# Patient Record
Sex: MALE | NOT HISPANIC OR LATINO | Employment: UNEMPLOYED | URBAN - METROPOLITAN AREA
[De-identification: names, ages, dates, MRNs, and addresses within clinical notes are randomized per-mention and may not be internally consistent; named-entity substitution may affect disease eponyms.]

---

## 2025-03-03 ENCOUNTER — OFFICE VISIT (OUTPATIENT)
Dept: GASTROENTEROLOGY | Facility: CLINIC | Age: 65
End: 2025-03-03
Payer: MEDICARE

## 2025-03-03 VITALS
TEMPERATURE: 97.3 F | BODY MASS INDEX: 46.65 KG/M2 | HEART RATE: 82 BPM | WEIGHT: 315 LBS | SYSTOLIC BLOOD PRESSURE: 145 MMHG | DIASTOLIC BLOOD PRESSURE: 85 MMHG | HEIGHT: 69 IN

## 2025-03-03 DIAGNOSIS — K31.A0 INTESTINAL METAPLASIA OF STOMACH: ICD-10-CM

## 2025-03-03 DIAGNOSIS — D64.9 ANEMIA, UNSPECIFIED TYPE: ICD-10-CM

## 2025-03-03 DIAGNOSIS — D51.0 PERNICIOUS ANEMIA: ICD-10-CM

## 2025-03-03 DIAGNOSIS — Z86.0100 HISTORY OF COLONIC POLYPS: Primary | ICD-10-CM

## 2025-03-03 PROCEDURE — 3008F BODY MASS INDEX DOCD: CPT | Performed by: STUDENT IN AN ORGANIZED HEALTH CARE EDUCATION/TRAINING PROGRAM

## 2025-03-03 PROCEDURE — 99204 OFFICE O/P NEW MOD 45 MIN: CPT | Performed by: STUDENT IN AN ORGANIZED HEALTH CARE EDUCATION/TRAINING PROGRAM

## 2025-03-03 PROCEDURE — 1159F MED LIST DOCD IN RCRD: CPT | Performed by: STUDENT IN AN ORGANIZED HEALTH CARE EDUCATION/TRAINING PROGRAM

## 2025-03-03 PROCEDURE — 99214 OFFICE O/P EST MOD 30 MIN: CPT | Performed by: STUDENT IN AN ORGANIZED HEALTH CARE EDUCATION/TRAINING PROGRAM

## 2025-03-03 RX ORDER — HYDROCHLOROTHIAZIDE 25 MG/1
25 TABLET ORAL DAILY
COMMUNITY

## 2025-03-03 RX ORDER — CYCLOBENZAPRINE HCL 10 MG
TABLET ORAL
COMMUNITY
Start: 2024-07-02

## 2025-03-03 RX ORDER — ACETAMINOPHEN 500 MG
TABLET ORAL
COMMUNITY
Start: 2022-03-01

## 2025-03-03 RX ORDER — MECLIZINE HYDROCHLORIDE 25 MG/1
25 TABLET ORAL 3 TIMES DAILY PRN
COMMUNITY

## 2025-03-03 RX ORDER — ALBUTEROL SULFATE 90 UG/1
INHALANT RESPIRATORY (INHALATION)
COMMUNITY
Start: 2022-02-10

## 2025-03-03 RX ORDER — LEVOTHYROXINE SODIUM 175 UG/1
175 TABLET ORAL
COMMUNITY

## 2025-03-03 RX ORDER — ACETAMINOPHEN 500 MG
TABLET ORAL
COMMUNITY
Start: 2024-05-23

## 2025-03-03 RX ORDER — POLYETHYLENE GLYCOL 3350, SODIUM SULFATE ANHYDROUS, SODIUM BICARBONATE, SODIUM CHLORIDE, POTASSIUM CHLORIDE 236; 22.74; 6.74; 5.86; 2.97 G/4L; G/4L; G/4L; G/4L; G/4L
4000 POWDER, FOR SOLUTION ORAL ONCE
Qty: 4000 ML | Refills: 0 | Status: SHIPPED | OUTPATIENT
Start: 2025-03-03 | End: 2025-03-03

## 2025-03-03 RX ORDER — ATENOLOL 50 MG/1
1 TABLET ORAL DAILY
COMMUNITY

## 2025-03-03 RX ORDER — ATORVASTATIN CALCIUM 40 MG/1
40 TABLET, FILM COATED ORAL DAILY
COMMUNITY

## 2025-03-03 NOTE — PROGRESS NOTES
"REASON FOR VISIT:  \"check up\", over 20 min late for appt    HPI:  Romario Mckee is a 64 y.o. male with a pmhx of obesity (BMI 52), chronic gastritis, fatty liver, choledocholithiasis s/p ERCP, CCY, HTN, hypothyroidism, HLD, perihepatic abscess 2019, OA who presents for evaluation. The patient is not sure why he is here or referred to GI.      Summary:   Seen by Dr. Harris 2018 for a perihepatic lesion, biopsy consistent with abscess s/p CT guided drainage and abx.     Today, he reports he does not know why he was referred to this appointment. Bms occur every morning, formed, no hematochezia or melena. + flatulence up to 10-15x/day. Ongoing for years. Eats lots of cabbage and beans.  Good appetite. Denies nausea, vomiting, abdominal pain, dysphagia, heartburn, regurgitation, change in bowel habits, early satiety, unintentional weight loss, fatigue, hematochezia, hematemesis, melena or fevers/chills. Not interested in bariatric evaluation.     Labs 1/2025 notable for mild anemia with CBC hgb 12.6, normocytic, AST 11.      No fhx of CRC, esophageal, pancreatic or gastric cancer. No tobacco use, occasional alcohol use.     Prev endoscopic eval:     Endoscopy history:  - Colonoscopy (colon cancer surveillance) 4/17/2017 - Seven diminutive (2 mm) polyps. Resected and retrieved. Path: Tas, inflammatory polyps  Recall 5 years    - EGD 10/6/2016 (Epigastric abdominal pain, Follow-up of acute gastric ulcer)  A few papules (nodules) found in the stomach. Biopsied.  Biopsies were taken with a cold forceps for histology in the gastric body and in the gastric antrum.  No gastric ulcer was seen.   Path: CHRONIC ATROPHIC GASTRITIS WITH INTESTINAL METAPLASIA. -ve HP    - ERCP 6/28/2016 (Stent removal) - Non-bleeding gastric ulcer with a clean ulcer base  (Kaushal Class III). One stent was removed from the biliary tree. The patient has had a cholecystectomy. The biliary tree was swept and sludge was found. Biopsy was performed in " the gastric antrum.  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    - ERCP 6/24/2016 (Suspected ascending cholangitis) Cholangitis. stent was placed into the common bile     - ERCP 6/15/16 (bile duct stone) The major papilla appeared normal.  Choledocholithiasis was found. Complete removal was accomplished by biliary sphincterotomy and basket extraction.     - Colonoscopy 11/22/2011 (screening) Small colonic polyp x 2. Path: TA x 1    REVIEW OF SYSTEMS  CONSTITUTIONAL: Negative for fevers  HEENT: Negative for frequent/significant headaches  RESPIRATORY: Negative for hemoptysis  CARDIOVASCULAR: Negative for chest pain  GI: See HPI  : Negative for hematuria  MUSCULOSKELETAL: Negative for arthralgia or myalgia  INTEGUMENTARY/SKIN: Negative for rash or skin lesion  HEMATOLOGY/LYMPHOLOGY: Negative for prolonged bleeding  ENDOCRINE: Negative for cold/heat intolerance  NEURO: Negative for encephalopathy  PSYCH: Negative for new changes in mood or affect      No Known Allergies    Past Medical History:   Diagnosis Date    Calculus of bile duct without cholangitis or cholecystitis without obstruction 02/03/2017    Choledocholithiasis    Dry eye syndrome of left lacrimal gland 04/30/2015    Dry eye syndrome of left lacrimal gland    Dry eye syndrome of left lacrimal gland 04/30/2015    Dry eye syndrome of left lacrimal gland    Encounter for follow-up examination after completed treatment for conditions other than malignant neoplasm 11/03/2016    Hospital discharge follow-up    Fatty (change of) liver, not elsewhere classified     Fatty liver    Hyperlipidemia, unspecified     Dyslipidemia    Male erectile dysfunction, unspecified     Inability to attain erection    Morbid (severe) obesity due to excess calories (Multi)     Morbid obesity    Other conditions influencing health status     Tubular Adenocarcinoma    Personal history  of colonic polyps 11/18/2016    History of colon polyps    Personal history of colonic polyps 11/18/2016    History of colonic polyps    Personal history of other diseases of the circulatory system     History of hypertension    Personal history of other diseases of the digestive system 02/03/2017    History of chronic gastritis    Personal history of other endocrine, nutritional and metabolic disease     History of hypothyroidism    Personal history of other endocrine, nutritional and metabolic disease     History of hypercholesterolemia    Personal history of other specified conditions     History of syncope    Personal history of other specified conditions     History of nausea and vomiting    Personal history of peptic ulcer disease 10/14/2016    History of gastric ulcer    Rash and other nonspecific skin eruption     Rash    Snoring     Snoring    Unspecified blepharitis right eye, unspecified eyelid 04/30/2015    Blepharitis of right eye    Unspecified blepharitis right eye, unspecified eyelid 04/30/2015    Blepharitis of right eye       Past Surgical History:   Procedure Laterality Date    KNEE SURGERY  07/09/2013    Knee Surgery Left    KNEE SURGERY  07/09/2013    Knee Surgery Left    OTHER SURGICAL HISTORY  07/09/2013    Anticipatory Guidance: Maintaining Healthy Weight       No family history on file.    Social History     Tobacco Use    Smoking status: Never    Smokeless tobacco: Not on file   Substance Use Topics    Alcohol use: Not on file       Current Outpatient Medications   Medication Sig Dispense Refill    acetaminophen (Tylenol Extra Strength) 500 mg tablet Take by mouth.      albuterol (Ventolin HFA) 90 mcg/actuation inhaler Inhale.      atenolol (Tenormin) 50 mg tablet Take 1 tablet (50 mg) by mouth once daily.      atorvastatin (Lipitor) 40 mg tablet Take 1 tablet (40 mg) by mouth once daily.      cholecalciferol (Vitamin D-3) 50 mcg (2,000 unit) capsule Take by mouth once daily in the morning.  "Take before meals.      cyclobenzaprine (Flexeril) 10 mg tablet TAKE 1 TABLET BY MOUTH EVERY 12 HOURS AS NEEDED FOR MUSCLE PAIN      hydroCHLOROthiazide (HYDRODiuril) 25 mg tablet Take 1 tablet (25 mg) by mouth once daily. for blood pressure      levothyroxine (Synthroid, Levoxyl) 175 mcg tablet Take 1 tablet (175 mcg) by mouth once daily in the morning. Take before meals. ON AN EMPTY STOMACH      meclizine (Antivert) 25 mg tablet Take 1 tablet (25 mg) by mouth 3 times a day as needed.       No current facility-administered medications for this visit.       PHYSICAL EXAM:  Ht 1.753 m (5' 9\")   Wt (!) 162 kg (358 lb)   BMI 52.87 kg/m²      Lab Results   Component Value Date    ALT 27 10/06/2022    AST 22 10/06/2022    ALKPHOS 61 10/06/2022    BILITOT 0.4 10/06/2022     Lab Results   Component Value Date    INR 1.0 10/06/2022     Lab Results   Component Value Date    WBC 3.6 (L) 10/06/2022    HGB 13.9 10/06/2022    MCV 92.5 10/06/2022     10/06/2022       === 08/04/22 ===    MR SHOULDER RIGHT WO IV CONTRAST    - Impression -  1. Full-thickness tear of the entire supraspinatus and infraspinatus can be  seen with extension of the musculotendinous fibers to level of the acromion.  2. Intra-articular split tearing biceps tendon and tenosynovitis.  3. Severe AC joint and glenohumeral osteoarthrosis.  4. Subcoracoid bursitis.    Dictation workstation:   [id number]    Original Interpreting Physician:   FARHAN PRICE MD  Original Transcribed by/Date: MMODAL Aug  4 2022  6:42A  Original Electronically Signed by/Date: FARHAN PRICE MD Aug  4 2022  2:55P    Addendum Interpreting Physician:  Addendum Transcribed by/Date: NO ADDENDUM  Addendum Electronically Signed by/Date:      ASSESSMENT  ***    PLAN  --  --sibo if negative  HM:  Colonoscopy:  Rangel's esophagus screening:  --unclear if ever tested for HCV    Consultation requested by Dr. Irby primary care provider on file..   My final recommendations will be " communicated back to the requesting physician by way of shared Medical record or letter to requesting physician via fax.      Attending Note:   I have personally performed a face to face assessment of this Patient, which included an interview, physical exam and Assessment and Plan.  I have reviewed and confirmed the history and key findings as documented by the Medical Assistant and edited as appropriate.     Signature: Nehal Luu MD    Date: 3/3/2025  Time: 11:33 AM

## 2025-03-03 NOTE — PATIENT INSTRUCTIONS
Thank you for seeing us in clinic today!     In summary, please do the following:  We will schedule you for your colonoscopy.  2.   Avoid all beans and cabbage for 2 weeks and assess for improvement in gas. You can also take gas-x as needed.   3. If that does not work, we can schedule you for bacterial overgrowth breath testing.     We will see you again in 3 months or sooner if needed.   If you have any questions or concerns, please call the office at [phone].

## 2025-03-03 NOTE — Clinical Note
Mack powell,  He is scheduled with me for 5/20 for colon. Any chance we can move him up to 4/8? The colons that morning are 1 hour each, so if we make them 40 minute slots there should be enough time for him as well. Tapardeepk you!

## 2025-04-08 ENCOUNTER — ANESTHESIA (OUTPATIENT)
Dept: GASTROENTEROLOGY | Facility: HOSPITAL | Age: 65
End: 2025-04-08
Payer: MEDICARE

## 2025-04-08 ENCOUNTER — HOSPITAL ENCOUNTER (OUTPATIENT)
Dept: GASTROENTEROLOGY | Facility: HOSPITAL | Age: 65
Discharge: HOME | End: 2025-04-08
Payer: MEDICARE

## 2025-04-08 ENCOUNTER — ANESTHESIA EVENT (OUTPATIENT)
Dept: GASTROENTEROLOGY | Facility: HOSPITAL | Age: 65
End: 2025-04-08
Payer: MEDICARE

## 2025-04-08 VITALS
WEIGHT: 315 LBS | HEIGHT: 69 IN | RESPIRATION RATE: 18 BRPM | TEMPERATURE: 97.7 F | HEART RATE: 85 BPM | DIASTOLIC BLOOD PRESSURE: 75 MMHG | OXYGEN SATURATION: 98 % | BODY MASS INDEX: 46.65 KG/M2 | SYSTOLIC BLOOD PRESSURE: 147 MMHG

## 2025-04-08 DIAGNOSIS — K31.84 GASTROPARESIS: Primary | ICD-10-CM

## 2025-04-08 DIAGNOSIS — Z12.11 ENCOUNTER FOR COLORECTAL CANCER SCREENING: ICD-10-CM

## 2025-04-08 DIAGNOSIS — K31.A0 INTESTINAL METAPLASIA OF STOMACH: ICD-10-CM

## 2025-04-08 DIAGNOSIS — Z12.12 ENCOUNTER FOR COLORECTAL CANCER SCREENING: ICD-10-CM

## 2025-04-08 PROBLEM — I10 HTN (HYPERTENSION): Status: ACTIVE | Noted: 2025-04-08

## 2025-04-08 PROBLEM — E66.9 OBESITY: Status: ACTIVE | Noted: 2025-04-08

## 2025-04-08 PROBLEM — E03.9 HYPOTHYROIDISM: Status: ACTIVE | Noted: 2025-04-08

## 2025-04-08 PROBLEM — F41.9 ANXIETY: Status: ACTIVE | Noted: 2025-04-08

## 2025-04-08 PROBLEM — J18.9 PNEUMONIA: Status: ACTIVE | Noted: 2025-04-08

## 2025-04-08 PROBLEM — J44.9 CHRONIC OBSTRUCTIVE PULMONARY DISEASE (MULTI): Status: ACTIVE | Noted: 2025-04-08

## 2025-04-08 PROBLEM — G47.33 OSA (OBSTRUCTIVE SLEEP APNEA): Status: ACTIVE | Noted: 2025-04-08

## 2025-04-08 PROBLEM — M19.90 OSTEOARTHRITIS: Status: ACTIVE | Noted: 2025-04-08

## 2025-04-08 PROCEDURE — 3700000002 HC GENERAL ANESTHESIA TIME - EACH INCREMENTAL 1 MINUTE

## 2025-04-08 PROCEDURE — 2500000004 HC RX 250 GENERAL PHARMACY W/ HCPCS (ALT 636 FOR OP/ED): Performed by: ANESTHESIOLOGIST ASSISTANT

## 2025-04-08 PROCEDURE — 7100000009 HC PHASE TWO TIME - INITIAL BASE CHARGE

## 2025-04-08 PROCEDURE — 7100000010 HC PHASE TWO TIME - EACH INCREMENTAL 1 MINUTE

## 2025-04-08 PROCEDURE — 3700000001 HC GENERAL ANESTHESIA TIME - INITIAL BASE CHARGE

## 2025-04-08 PROCEDURE — 43235 EGD DIAGNOSTIC BRUSH WASH: CPT | Performed by: STUDENT IN AN ORGANIZED HEALTH CARE EDUCATION/TRAINING PROGRAM

## 2025-04-08 RX ORDER — PROPOFOL 10 MG/ML
INJECTION, EMULSION INTRAVENOUS AS NEEDED
Status: DISCONTINUED | OUTPATIENT
Start: 2025-04-08 | End: 2025-04-08

## 2025-04-08 RX ORDER — IBUPROFEN 400 MG/1
400 TABLET ORAL EVERY 6 HOURS PRN
COMMUNITY

## 2025-04-08 RX ORDER — LIDOCAINE HYDROCHLORIDE 20 MG/ML
INJECTION, SOLUTION EPIDURAL; INFILTRATION; INTRACAUDAL; PERINEURAL AS NEEDED
Status: DISCONTINUED | OUTPATIENT
Start: 2025-04-08 | End: 2025-04-08

## 2025-04-08 RX ADMIN — PROPOFOL 30 MG: 10 INJECTION, EMULSION INTRAVENOUS at 09:56

## 2025-04-08 RX ADMIN — PROPOFOL 70 MG: 10 INJECTION, EMULSION INTRAVENOUS at 09:53

## 2025-04-08 RX ADMIN — LIDOCAINE HYDROCHLORIDE 50 MG: 20 INJECTION, SOLUTION EPIDURAL; INFILTRATION; INTRACAUDAL; PERINEURAL at 09:53

## 2025-04-08 RX ADMIN — LIDOCAINE HYDROCHLORIDE 50 MG: 20 INJECTION, SOLUTION EPIDURAL; INFILTRATION; INTRACAUDAL; PERINEURAL at 09:54

## 2025-04-08 RX ADMIN — PROPOFOL 125 MCG/KG/MIN: 10 INJECTION, EMULSION INTRAVENOUS at 09:54

## 2025-04-08 ASSESSMENT — PAIN SCALES - GENERAL
PAINLEVEL_OUTOF10: 0 - NO PAIN
PAIN_LEVEL: 0
PAINLEVEL_OUTOF10: 0 - NO PAIN

## 2025-04-08 ASSESSMENT — ENCOUNTER SYMPTOMS
UNEXPECTED WEIGHT CHANGE: 0
SHORTNESS OF BREATH: 0
VOMITING: 0
TROUBLE SWALLOWING: 0
DIARRHEA: 0
ARTHRALGIAS: 0
BLOOD IN STOOL: 0
DIFFICULTY URINATING: 0
SPEECH DIFFICULTY: 0
CONSTIPATION: 0
COUGH: 0
NAUSEA: 0
LIGHT-HEADEDNESS: 0
JOINT SWELLING: 0
ABDOMINAL PAIN: 0
HEADACHES: 0
COLOR CHANGE: 0
CONFUSION: 0
SLEEP DISTURBANCE: 0
FEVER: 0
DIZZINESS: 0
ABDOMINAL DISTENTION: 0
WHEEZING: 0
CHILLS: 0

## 2025-04-08 ASSESSMENT — PAIN - FUNCTIONAL ASSESSMENT
PAIN_FUNCTIONAL_ASSESSMENT: 0-10

## 2025-04-08 ASSESSMENT — COLUMBIA-SUICIDE SEVERITY RATING SCALE - C-SSRS
6. HAVE YOU EVER DONE ANYTHING, STARTED TO DO ANYTHING, OR PREPARED TO DO ANYTHING TO END YOUR LIFE?: NO
2. HAVE YOU ACTUALLY HAD ANY THOUGHTS OF KILLING YOURSELF?: NO
1. IN THE PAST MONTH, HAVE YOU WISHED YOU WERE DEAD OR WISHED YOU COULD GO TO SLEEP AND NOT WAKE UP?: NO

## 2025-04-08 NOTE — NURSING NOTE
1007 Pt arrived to [facility] , pt on monitor and report given. Pt in phase 2 care     1023 All DC instructions given to pt and family     1028 Dr. Luu speaking with pt and family     1037 Phase 2 care complete     1052 Pt dressed with family at bedside    1059 IV removed    1101 Pt to lobby with transport

## 2025-04-08 NOTE — ANESTHESIA PREPROCEDURE EVALUATION
Patient: Romario Mckee    Procedure Information       Date/Time: 04/08/25 1000    Scheduled providers: Nehal Luu MD; Sixto Easley MD    Procedures:       COLONOSCOPY      EGD    Location: [facility]            Relevant Problems   Anesthesia (within normal limits)      Cardiac   (+) HTN (hypertension)      Pulmonary   (+) Chronic obstructive pulmonary disease (Multi)   (+) YOHANA (obstructive sleep apnea) (Possible)   (+) Pneumonia (remote)      Neuro   (+) Anxiety      GI (within normal limits)      /Renal (within normal limits)      Liver (within normal limits)      Endocrine   (+) Hypothyroidism   (+) Obesity      Musculoskeletal   (+) Osteoarthritis      ID   (+) Pneumonia (remote)       Clinical information reviewed:                   NPO Detail:  No data recorded     Physical Exam    Airway  Mallampati: I     Cardiovascular    Dental   (+) upper dentures  Comments: Partial upper   Pulmonary    Abdominal            Anesthesia Plan    History of general anesthesia?: yes  History of complications of general anesthesia?: no    ASA 3     MAC     intravenous induction   Anesthetic plan and risks discussed with patient.

## 2025-04-08 NOTE — ANESTHESIA POSTPROCEDURE EVALUATION
Patient: Romario Mckee    Procedure Summary       Date: 04/08/25 Room / Location: [facility]    Anesthesia Start: 0930 Anesthesia Stop: 1015    Procedures:       COLONOSCOPY      EGD Diagnosis:       Encounter for colorectal cancer screening      Intestinal metaplasia of stomach    Scheduled Providers: Nehal Luu MD; Sixto Easley MD; Marcia Araya RN Responsible Provider: Sixto Easley MD    Anesthesia Type: MAC ASA Status: 3            Anesthesia Type: MAC    Vitals Value Taken Time   /75 04/08/25 1037   Temp 36.5 °C (97.7 °F) 04/08/25 1037   Pulse 85 04/08/25 1037   Resp 18 04/08/25 1037   SpO2 98 % 04/08/25 1037       Anesthesia Post Evaluation    Patient location during evaluation: bedside  Patient participation: complete - patient cannot participate  Level of consciousness: awake  Pain score: 0  Pain management: adequate  Airway patency: patent  Cardiovascular status: acceptable and hemodynamically stable  Respiratory status: acceptable and nonlabored ventilation  Hydration status: acceptable  Postoperative Nausea and Vomiting: none        No notable events documented.

## 2025-04-08 NOTE — H&P
History Of Present Illness  Romario Mckee is a 65 y.o. male presenting with upper endoscopy and colonoscopy for surveillance of prior polyps and gastric mapping for intestinal metaplasia.     Past Medical History  Past Medical History:   Diagnosis Date    Calculus of bile duct without cholangitis or cholecystitis without obstruction 02/03/2017    Choledocholithiasis    Dry eye syndrome of left lacrimal gland 04/30/2015    Dry eye syndrome of left lacrimal gland    Dry eye syndrome of left lacrimal gland 04/30/2015    Dry eye syndrome of left lacrimal gland    Encounter for follow-up examination after completed treatment for conditions other than malignant neoplasm 11/03/2016    Hospital discharge follow-up    Fatty (change of) liver, not elsewhere classified     Fatty liver    Hyperlipidemia, unspecified     Dyslipidemia    Male erectile dysfunction, unspecified     Inability to attain erection    Morbid (severe) obesity due to excess calories (Multi)     Morbid obesity    Other conditions influencing health status     Tubular Adenocarcinoma    Personal history of colonic polyps 11/18/2016    History of colon polyps    Personal history of colonic polyps 11/18/2016    History of colonic polyps    Personal history of other diseases of the circulatory system     History of hypertension    Personal history of other diseases of the digestive system 02/03/2017    History of chronic gastritis    Personal history of other endocrine, nutritional and metabolic disease     History of hypothyroidism    Personal history of other endocrine, nutritional and metabolic disease     History of hypercholesterolemia    Personal history of other specified conditions     History of syncope    Personal history of other specified conditions     History of nausea and vomiting    Personal history of peptic ulcer disease 10/14/2016    History of gastric ulcer    Rash and other nonspecific skin eruption     Rash    Snoring     Snoring     Unspecified blepharitis right eye, unspecified eyelid 04/30/2015    Blepharitis of right eye    Unspecified blepharitis right eye, unspecified eyelid 04/30/2015    Blepharitis of right eye     Surgical History  Past Surgical History:   Procedure Laterality Date    KNEE SURGERY  07/09/2013    Knee Surgery Left    KNEE SURGERY  07/09/2013    Knee Surgery Left    OTHER SURGICAL HISTORY  07/09/2013    Anticipatory Guidance: Maintaining Healthy Weight    SHOULDER SURGERY       Social History  He reports that he has never smoked. He has never used smokeless tobacco. No history on file for alcohol use and drug use.    Family History  No family history on file.     Allergies  No Known Allergies  Review of Systems   Constitutional:  Negative for chills, fever and unexpected weight change.   HENT:  Negative for congestion and trouble swallowing.    Respiratory:  Negative for cough, shortness of breath and wheezing.    Cardiovascular:  Negative for chest pain.   Gastrointestinal:  Negative for abdominal distention, abdominal pain, blood in stool, constipation, diarrhea, nausea and vomiting.   Genitourinary:  Negative for difficulty urinating.   Musculoskeletal:  Negative for arthralgias and joint swelling.   Skin:  Negative for color change.   Neurological:  Negative for dizziness, speech difficulty, light-headedness and headaches.   Psychiatric/Behavioral:  Negative for confusion and sleep disturbance.         Physical Exam  Constitutional:       General: He is awake.      Appearance: Normal appearance.   HENT:      Head: Normocephalic and atraumatic.      Nose: Nose normal.      Mouth/Throat:      Mouth: Mucous membranes are moist.   Eyes:      Pupils: Pupils are equal, round, and reactive to light.   Neck:      Thyroid: No thyroid mass.      Trachea: Phonation normal.   Cardiovascular:      Rate and Rhythm: Normal rate and regular rhythm.   Pulmonary:      Effort: Pulmonary effort is normal. No respiratory distress.       Breath sounds: Normal air entry. No decreased breath sounds, wheezing, rhonchi or rales.   Abdominal:      General: Bowel sounds are normal. There is no distension.      Palpations: Abdomen is soft.      Tenderness: There is no abdominal tenderness.   Musculoskeletal:      Cervical back: Neck supple.      Right lower leg: No edema.      Left lower leg: No edema.   Skin:     General: Skin is warm.      Capillary Refill: Capillary refill takes less than 2 seconds.   Neurological:      General: No focal deficit present.      Mental Status: He is alert and oriented to person, place, and time. Mental status is at baseline.      Cranial Nerves: Cranial nerves 2-12 are intact.      Motor: Motor function is intact.   Psychiatric:         Attention and Perception: Attention and perception normal.         Mood and Affect: Mood normal.         Speech: Speech normal.         Behavior: Behavior normal.          Last Recorded Vitals  Weight (!) 170 kg (374 lb 12.5 oz).    Assessment/Plan   upper endoscopy and colonoscopy for surveillance of prior polyps and gastric mapping for intestinal metaplasia.     Nehal Luu MD

## 2025-04-09 ASSESSMENT — PAIN SCALES - GENERAL: PAINLEVEL_OUTOF10: 0 - NO PAIN

## 2025-04-12 LAB
FERRITIN SERPL-MCNC: 59 NG/ML (ref 24–380)
GASTRIN SERPL-MCNC: 403 PG/ML
IF BLOCK AB SER QL: NEGATIVE
IRON SATN MFR SERPL: 31 % (CALC) (ref 20–48)
IRON SERPL-MCNC: 78 MCG/DL (ref 50–180)
PCA AB SER-ACNC: <=20 UNIT
TIBC SERPL-MCNC: 252 MCG/DL (CALC) (ref 250–425)
VIT B12 SERPL-MCNC: 299 PG/ML (ref 200–1100)

## 2025-05-20 ENCOUNTER — APPOINTMENT (OUTPATIENT)
Dept: GASTROENTEROLOGY | Facility: HOSPITAL | Age: 65
End: 2025-05-20
Payer: MEDICARE

## 2025-05-22 ENCOUNTER — APPOINTMENT (OUTPATIENT)
Dept: RADIOLOGY | Facility: HOSPITAL | Age: 65
End: 2025-05-22
Payer: MEDICARE